# Patient Record
Sex: FEMALE | Race: WHITE | Employment: OTHER | URBAN - METROPOLITAN AREA
[De-identification: names, ages, dates, MRNs, and addresses within clinical notes are randomized per-mention and may not be internally consistent; named-entity substitution may affect disease eponyms.]

---

## 2022-02-24 ENCOUNTER — HOSPITAL ENCOUNTER (OUTPATIENT)
Dept: PHYSICAL THERAPY | Age: 67
Discharge: HOME | End: 2022-02-24
Payer: MEDICARE

## 2022-02-24 PROCEDURE — 97530 THERAPEUTIC ACTIVITIES: CPT

## 2022-02-24 PROCEDURE — 97163 PT EVAL HIGH COMPLEX 45 MIN: CPT

## 2022-02-24 NOTE — PROGRESS NOTES
Julisa Bundy  : 1955  [place]  Secondary:  [address] at [place] [id number] [facility], [address] [place] [address],[address], [address]  [phone]   [phone]       OUTPATIENT PHYSICAL THERAPY: Daily Treatment Note 2022   Visit Count:  1     ICD-10: Treatment Diagnosis: Lack of coordination (muscle incoordination) (R27.8), Pelvic floor dysfunction in female (M62.98), Mixed incontinence (Urge and stress incontinence) (N39.46), Nocturia (R35.1), Constipation, unspecified (K59.00), Pelvic and perineal pain (R10.2), Lower abdominal pain, unspecified (R10.30), Muscle spasm (M62.83) and Myalgia (M79.1)  Precautions/Allergies:   Patient has no allergy information on record. TREATMENT PLAN:  Effective Dates: 2022 TO 2022 (90 days). Frequency/Duration: 1 time a week for 90 Day(s) MEDICAL/REFERRING DIAGNOSIS:  Pelvic and perineal pain [R10.2]  Constipation [K59.00]  DATE OF ONSET: Chronic  REFERRING PHYSICIAN: Maday Zepeda, *  MD Orders: Evaluate and treat  Return MD Appointment: Unknown     Pre-treatment Symptoms/Complaints: pelvic/perineal pain, constipation, urinary incontinence (mixed)    Pain: Initial: Pain Intensity 1: 310 Post Session:  3/10   Medications Last Reviewed:  2022  Updated Objective Findings:  See evaluation note from today      TREATMENT:   THERAPEUTIC EXERCISE: (0 minutes):  Exercises per grid below to improve mobility and coordination. Required minimal visual, verbal and tactile cues to promote proper body mechanics and promote proper body breathing techniques. Progressed resistance and repetitions as indicated.    Date:   Date:   Date:     Activity/Exercise Parameters Parameters Parameters                       THERAPEUTIC ACTIVITY: (25 minutes): Functional activity education regarding anatomy, pathology and role of pelvic floor muscle (PFM) function in relation to presenting symptoms and role of pelvic floor therapy in conservative treatment., Functional activity education on avoiding bladder irritants, substitutions for common irritants, recommended fluid intake (types and spacing), appropriate voiding frequency, weight management and overall contributing factors of bowel health on bladder health/function in order to improve independent self management. Patient was provided a list of common bladder irritants including caffeine, carbonation, alcohol, artificial sweeteners, chocolate, acidic drinks, and tomato based drinks., Functional activity training to improve toilet positioning and pelvic floor muscle relaxation, to increase anorectal angle in order to improve bowel/bladder emptying and minimize straining/paradoxical PFM activation during defecation. and Instruction on coordinated pelvic floor and diaphragmatic breathing to improve kinesthetic awareness of pelvic muscle mobility and restore proper motor recruitment patterns with breathing, posture, and functional movement (e.g. appropriate lift/contraction with increased IAP such as a cough, laugh, sneeze to prevent incontinence as well as appropriate relaxation/drop to reduce pain with vaginal penetration). TA Educational Topic Notes Date Completed   Pathology/Anatomy/PFM Function Reviewed 2/24/22   Bladder health education Discussed healthy bladder habits (emphasis on water intake) 2/24/22   Urinary urge suppression     The knack     Voiding strategies     Bowel/Bladder log     Bowel health education     Constipation care     Diarrhea/Fecal leakage     Colonic massage     Toilet positioning Demonstrated and reviewed, handout provided 2/24/22   Defecation dynamics     Sources of fiber     Return to intercourse/Dilator training     Sexual positioning     Lubricants/vaginal moisturizers     Vaginal irritants     Body mechanics     Posture/ergonomics     Diaphragmatic breathing Demonstrated and reviewed 2/24/22   Resources and technology       MANUAL THERAPY: (0 minutes):  Soft tissue mobilization was utilized and necessary because of the patient's painful spasm and restricted motion of soft tissue. Date Type Location Comments   2/24/2022 Internal assessment/treatment Via vaginal canal Assessment completed                                       (Used abbreviations: MET - muscle energy technique; SCS- Strain counter strain; CTM-Connective tissue mobilizations; CR- Contract/Relax; SP- Sustained pressure; PIT- Positional inhibition techniques; STM Soft -tissue mobilization; MM- Myofascial mobilization; TrP-Trigger point release; IASTM- Instrument assisted soft tissue mobilizations, TDN-Trigger point dry needling)    Pt gives verbal consent to internal vaginal assessment/treatment without chaperon present. Treatment/Session Summary:    · Response to Treatment:  Pt reports good understanding of plan of care, as well as prescribed home exercise program.  All questions were answered to pt's satisfaction. Pt was invited to call with any further questions or concerns. · Communication/Consultation:  None today  · Equipment provided today:  None today  · Recommendations/Intent for next treatment session: Next visit will focus on MSK and functional movement screen, hip/PFM mobility as appropriate, review of toilet positioning, education on constipation management, manual techniques to hip/PFM as appropriate.   · Variation from POC: N/A  Total Treatment Billable Duration: Evaluation 45 minutes, treatment 25 minutes  PT Patient Time In/Time Out  Time In: 1378  Time Out: 2202 John Saavedra PT     Future Appointments   Date Time Provider Jose Loyd   3/3/2022 11:00 AM Dean Dave PT [facility]   3/11/2022 11:00 AM Dean Dave PT [facility]   3/17/2022 11:00 AM Dean Dave PT [facility]   3/24/2022 11:00 AM Dean Dave PT [facility]   3/31/2022 11:00 AM Dean Dave PT [facility]     Visit # Therapist initials Date Arrived NS/ Cx < 24 hr >24 hr Cx Visit Comments   1 CORINNE 2/24/22 12:21pm   Initial Assessment and Treatment                                                  Abbreviations: NS = No Show; CX = cancelled
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
## 2022-03-01 PROBLEM — E55.9 VITAMIN D DEFICIENCY: Status: ACTIVE | Noted: 2022-03-01

## 2022-03-03 ENCOUNTER — HOSPITAL ENCOUNTER (OUTPATIENT)
Dept: PHYSICAL THERAPY | Age: 67
Discharge: HOME | End: 2022-03-03
Payer: MEDICARE

## 2022-03-03 PROCEDURE — 97110 THERAPEUTIC EXERCISES: CPT

## 2022-03-03 PROCEDURE — 97530 THERAPEUTIC ACTIVITIES: CPT

## 2022-03-03 NOTE — PROGRESS NOTES
Antione Brenner  : 1955  [place]  Secondary:  [address]  at [place] [id number] [facility], [address] [place] [address],[address], [address]  [phone]   [phone]       OUTPATIENT PHYSICAL THERAPY: Daily Treatment Note 3/3/2022   Visit Count:  2     ICD-10: Treatment Diagnosis: Lack of coordination (muscle incoordination) (R27.8), Pelvic floor dysfunction in female (M62.98), Mixed incontinence (Urge and stress incontinence) (N39.46), Nocturia (R35.1), Constipation, unspecified (K59.00), Pelvic and perineal pain (R10.2), Lower abdominal pain, unspecified (R10.30), Muscle spasm (M62.83) and Myalgia (M79.1)  Precautions/Allergies:   Patient has no allergy information on record. TREATMENT PLAN:  Effective Dates: 2022 TO 2022 (90 days).   Frequency/Duration: 1 time a week for 90 Day(s) MEDICAL/REFERRING DIAGNOSIS:  Pelvic and perineal pain [R10.2]  Constipation [K59.00]  DATE OF ONSET: Chronic  REFERRING PHYSICIAN: Kurt Villafana, *  MD Orders: Evaluate and treat  Return MD Appointment: Unknown     Pre-treatment Symptoms/Complaints: pelvic/perineal pain, constipation, urinary incontinence (mixed), nocturia    Pain: in rectal area and in lower back currently, ate almonds yesterday which she feels did not help  Constipation: was doing pretty well until she ate almonds and overate in general yesterday, tried not to take laxatives so she could see how she did, did use preparation H suppository a couple of times, glycerin suppository once, [place] Stool Scale: 1 (yesterday and today)  Urinary leakage: did have one instance of leakage, small amount upon standing  Urinary frequency: 5-6x/day, 1-2x/night  Water intake: 48 oz/day  HEP: toilet positioning helped with BM slightly (was sitting up straight and engaging abdominals)    No longer feels like she has a UTI, no pain in her bladder anymore    Pain: Initial: Pain Intensity 1: 6/10 Post Session:  5/10 Medications Last Reviewed:  2/24/2022  Updated Objective Findings:  See below     3/3/22:  Palpation: via vaginal canal - NOT TODAY   Superficial Pelvic Floor Musculature (PFM): Tender? Intermediate PFM Tender? Deep PFM Tender? Superficial Transverse Perineal [] Right  [] Left  []DNT Deep Transverse Perineal [] Right  [] Left  []DNT Puborectalis [] Right  [] Left  []DNT   Ischiocavernosus [] Right  [] Left  []DNT Compressor Urethra [] Right  [] Left  []DNT Pubococcygeus [] Right  [] Left  []DNT   Bulbocavernosus [] Right  [] Left  []DNT   Iliococcygeus [] Right  [] Left  []DNT       Obturator Internus [] Right  [] Left  []DNT       Coccygeus [] Right  [] Left  []DNT     Strength:   Left Right   Hip flexion (L2/3) 5/5 5/5   Hip extension (L4-S1) 3+/5 3+/5   Hip internal rotation (L4-S1) 4/5 4+/5   Hip external rotation (L4-S1) 4/5 4/5   Hip abduction (L4-S1) 4-/5 4-/5     Range of motion:  Hip IR limited compared to hip ER bilaterally, tightness with hip IR  Otherwise WFL    External palpation:  Muscle/muscle group Tender? Adductors [x] Right  [x] Left  []DNT   Gluteals [x] Right  [x] Left  []DNT   Piriformis [x] Right  [x] Left  []DNT   Iliopsoas [] Right  [] Left  []DNT   Abdominal wall [x] Right  [x] Left  []DNT   Pubic symphysis [] Right  [] Left  []DNT         TREATMENT:   THERAPEUTIC EXERCISE: (25 minutes):  Exercises per grid below to improve mobility and coordination. Required minimal visual, verbal and tactile cues to promote proper body mechanics and promote proper body breathing techniques. Progressed resistance and repetitions as indicated.    Date:  3/3/22 Date:   Date:     Activity/Exercise Parameters Parameters Parameters   Piriformis stretch Seated, 4x30s bilaterally     Chair hamstring/inner thigh stretch 4x30s bilaterally     Happy baby stretch 4x30s     Butterfly stretch Unilateral, 4x30s bilaterally             THERAPEUTIC ACTIVITY: (30 minutes): Functional activity education on avoiding bladder irritants, substitutions for common irritants, recommended fluid intake (types and spacing), appropriate voiding frequency, weight management and overall contributing factors of bowel health on bladder health/function in order to improve independent self management. Patient was provided a list of common bladder irritants including caffeine, carbonation, alcohol, artificial sweeteners, chocolate, acidic drinks, and tomato based drinks., Functional activity training to improve toilet positioning and pelvic floor muscle relaxation, to increase anorectal angle in order to improve bowel/bladder emptying and minimize straining/paradoxical PFM activation during defecation. and Instruction on coordinated pelvic floor and diaphragmatic breathing to improve kinesthetic awareness of pelvic muscle mobility and restore proper motor recruitment patterns with breathing, posture, and functional movement (e.g. appropriate lift/contraction with increased IAP such as a cough, laugh, sneeze to prevent incontinence as well as appropriate relaxation/drop to reduce pain with vaginal penetration). TA Educational Topic Notes Date Completed   Pathology/Anatomy/PFM Function Reviewed 2/24/22   Bladder health education Discussed healthy bladder habits (emphasis on water intake) 2/24/22  3/3/22   Urinary urge suppression     The knack     Voiding strategies     Bowel/Bladder log     Bowel health education     Constipation care     Diarrhea/Fecal leakage     Colonic massage     Toilet positioning Demonstrated and reviewed, handout provided 2/24/22  3/3/22   Defecation dynamics     Sources of fiber     Return to intercourse/Dilator training     Sexual positioning     Lubricants/vaginal moisturizers     Vaginal irritants     Body mechanics     Posture/ergonomics     Diaphragmatic breathing Demonstrated and reviewed 2/24/22  3/3/22   Resources and technology       MANUAL THERAPY: (0 minutes):  Soft tissue mobilization was utilized and necessary because of the patient's painful spasm and restricted motion of soft tissue. Date Type Location Comments   3/3/2022 Internal assessment/treatment Via vaginal canal NOT TODAY                                       (Used abbreviations: MET - muscle energy technique; SCS- Strain counter strain; CTM-Connective tissue mobilizations; CR- Contract/Relax; SP- Sustained pressure; PIT- Positional inhibition techniques; STM Soft -tissue mobilization; MM- Myofascial mobilization; TrP-Trigger point release; IASTM- Instrument assisted soft tissue mobilizations, TDN-Trigger point dry needling)    Pt gives verbal consent to internal vaginal assessment/treatment without chaperon present. - NOT TODAY    Treatment/Session Summary:    · Response to Treatment:  Pt reports good understanding of plan of care, as well as prescribed home exercise program (hip/PFM mobility, toilet positioning, adequate water intake). All questions were answered to pt's satisfaction. Pt was invited to call with any further questions or concerns. · Communication/Consultation:  None today  · Equipment provided today:  None today  · Recommendations/Intent for next treatment session: Next visit will focus on hip/PFM mobility, review of toilet positioning, education on constipation management, manual techniques to hip/PFM as appropriate.   · Variation from POC: N/A  Total Treatment Billable Duration: Treatment 55 minutes  PT Patient Time In/Time Out  Time In: 1100  Time Out: 500 [facility],      Future Appointments   Date Time Provider Jose Loyd   3/11/2022 11:00 AM Mahesh Chang PT [facility]FE   3/17/2022 11:00 AM JULY Nicholas S[facility]   3/24/2022 11:00 AM Mahesh Chang PT [facility]   3/29/2022 11:00 AM [facility]   3/31/2022 11:00 AM Mahesh Chang PT [facility]   2022  2:00 PM Sallyanne Apgar, MD [facility]     Visit # Therapist initials Date Arrived NS/ Cx < 24 hr >24 hr Cx Visit Comments   1 [facility] 2/24/22 12:21pm   Initial Assessment and Treatment   2 [facility] 3/3/22 11:00am   Treatment                                         Abbreviations: NS = No Show; CX = cancelled

## 2022-03-11 ENCOUNTER — HOSPITAL ENCOUNTER (OUTPATIENT)
Dept: PHYSICAL THERAPY | Age: 67
Discharge: HOME | End: 2022-03-11
Payer: MEDICARE

## 2022-03-11 PROCEDURE — 97140 MANUAL THERAPY 1/> REGIONS: CPT

## 2022-03-11 PROCEDURE — 97110 THERAPEUTIC EXERCISES: CPT

## 2022-03-11 PROCEDURE — 97530 THERAPEUTIC ACTIVITIES: CPT

## 2022-03-11 NOTE — PROGRESS NOTES
Lesia Kramer  : 1955  [place]  Secondary:  [address]  at [address] [facility] [address] [place] [address]  [phone]   [phone]       OUTPATIENT PHYSICAL THERAPY: Daily Treatment Note 3/11/2022   Visit Count:  3     ICD-10: Treatment Diagnosis: Lack of coordination (muscle incoordination) (R27.8), Pelvic floor dysfunction in female (M62.98), Mixed incontinence (Urge and stress incontinence) (N39.46), Nocturia (R35.1), Constipation, unspecified (K59.00), Pelvic and perineal pain (R10.2), Lower abdominal pain, unspecified (R10.30), Muscle spasm (M62.83) and Myalgia (M79.1)  Precautions/Allergies:   Patient has no allergy information on record. TREATMENT PLAN:  Effective Dates: 2022 TO 2022 (90 days).   Frequency/Duration: 1 time a week for 90 Day(s) MEDICAL/REFERRING DIAGNOSIS:  Pelvic and perineal pain [R10.2]  Constipation [K59.00]  DATE OF ONSET: Chronic  REFERRING PHYSICIAN: Brent Ruiz, *  MD Orders: Evaluate and treat  Return MD Appointment: Unknown     Pre-treatment Symptoms/Complaints: pelvic/perineal pain, constipation, urinary incontinence (mixed), nocturia    Pain: happy baby was very helpful, doesn't feel pain when she walks anymore  Constipation: still has issues with this, had to take a general laxative two days ago, bulky stools, after a BM helps to relieve pressure  Urinary leakage: just very small amounts, much better control than it was, able to hold it until she gets to the bathroom most of the time  PPD: 1  Urinary frequency: 5-6x/day, 1x/night  Water intake: 32 oz/day  HEP: toilet positioning helped with BM, goes through her stretches in the morning    Is sleeping better  Holding urine better  Started back with intermittent fasting - has already lost a little bit of weight    1/10 discomfort - washed perineum with soap - feels like it's a little irritated  Being more careful with lifting objects to make sure she's not putting excess pressure on bladder/PFM    Pain: Initial: Pain Intensity 1: 1/10 Post Session:  1/10   Medications Last Reviewed:  2/24/2022  Updated Objective Findings:  See below     3/11/22:  Palpation: via vaginal canal (slight tension present superficially)  Superficial Pelvic Floor Musculature (PFM): Tender? Intermediate PFM Tender? Deep PFM Tender? Superficial Transverse Perineal [] Right  [] Left  []DNT Deep Transverse Perineal [] Right  [] Left  []DNT Puborectalis [x] Right  [] Left  []DNT   Ischiocavernosus [] Right  [] Left  []DNT Compressor Urethra [] Right  [] Left  []DNT Pubococcygeus [x] Right  [x] Left  []DNT   Bulbocavernosus [] Right  [] Left  []DNT   Iliococcygeus [x] Right  [x] Left  []DNT       Obturator Internus [x] Right  [] Left  []DNT       Coccygeus [] Right  [] Left  []DNT     3/3/22:  Strength:   Left Right   Hip flexion (L2/3) 5/5 5/5   Hip extension (L4-S1) 3+/5 3+/5   Hip internal rotation (L4-S1) 4/5 4+/5   Hip external rotation (L4-S1) 4/5 4/5   Hip abduction (L4-S1) 4-/5 4-/5     Range of motion:  Hip IR limited compared to hip ER bilaterally, tightness with hip IR  Otherwise WFL    External palpation:  Muscle/muscle group Tender? Adductors [x] Right  [x] Left  []DNT   Gluteals [x] Right  [x] Left  []DNT   Piriformis [x] Right  [x] Left  []DNT   Iliopsoas [] Right  [] Left  []DNT   Abdominal wall [x] Right  [x] Left  []DNT   Pubic symphysis [] Right  [] Left  []DNT         TREATMENT:   THERAPEUTIC EXERCISE: (10 minutes):  Exercises per grid below to improve mobility and coordination. Required minimal visual, verbal and tactile cues to promote proper body mechanics and promote proper body breathing techniques. Progressed resistance and repetitions as indicated.    Date:  3/3/22 Date:  3/11/22 Date:     Activity/Exercise Parameters Parameters Parameters   Piriformis stretch Seated, 4x30s bilaterally     Chair hamstring/inner thigh stretch 4x30s bilaterally     Happy baby stretch 4x30s 4x30s    Butterfly stretch Unilateral, 4x30s bilaterally Unilateral, 4x30s bilaterally            THERAPEUTIC ACTIVITY: (15 minutes): Functional activity education on avoiding bladder irritants, substitutions for common irritants, recommended fluid intake (types and spacing), appropriate voiding frequency, weight management and overall contributing factors of bowel health on bladder health/function in order to improve independent self management. Patient was provided a list of common bladder irritants including caffeine, carbonation, alcohol, artificial sweeteners, chocolate, acidic drinks, and tomato based drinks., Functional activity training to improve toilet positioning and pelvic floor muscle relaxation, to increase anorectal angle in order to improve bowel/bladder emptying and minimize straining/paradoxical PFM activation during defecation. and Instruction on coordinated pelvic floor and diaphragmatic breathing to improve kinesthetic awareness of pelvic muscle mobility and restore proper motor recruitment patterns with breathing, posture, and functional movement (e.g. appropriate lift/contraction with increased IAP such as a cough, laugh, sneeze to prevent incontinence as well as appropriate relaxation/drop to reduce pain with vaginal penetration).   TA Educational Topic Notes Date Completed   Pathology/Anatomy/PFM Function Reviewed 2/24/22   Bladder health education Discussed healthy bladder habits (emphasis on water intake) 2/24/22  3/3/22  3/11/22   Urinary urge suppression     The knack     Voiding strategies     Bowel/Bladder log     Bowel health education     Constipation care     Diarrhea/Fecal leakage     Colonic massage     Toilet positioning Demonstrated and reviewed, handout provided 2/24/22  3/3/22  3/11/22   Defecation dynamics     Sources of fiber     Return to intercourse/Dilator training     Sexual positioning     Lubricants/vaginal moisturizers Vaginal irritants     Body mechanics     Posture/ergonomics     Diaphragmatic breathing Demonstrated and reviewed 2/24/22  3/3/22  3/11/22   Resources and technology       MANUAL THERAPY: (45 minutes): Soft tissue mobilization was utilized and necessary because of the patient's painful spasm and restricted motion of soft tissue. Date Type Location Comments   3/11/2022 Internal assessment/treatment Via vaginal canal SP, STM, and CR at [facility]     Hip adductors STM, TrP, and skin rolling to bilateral hip adductors                                 (Used abbreviations: MET - muscle energy technique; SCS- Strain counter strain; CTM-Connective tissue mobilizations; CR- Contract/Relax; SP- Sustained pressure; PIT- Positional inhibition techniques; STM Soft -tissue mobilization; MM- Myofascial mobilization; TrP-Trigger point release; IASTM- Instrument assisted soft tissue mobilizations, TDN-Trigger point dry needling)    Pt gives verbal consent to internal vaginal assessment/treatment without chaperon present. Treatment/Session Summary:    · Response to Treatment:  Pt tolerated manual techniques well. Fewer tender points than at initial evaluation. Tender spots that were present decreased in intensity with manual techniques and diaphragmatic breathing. Pt did well with hip/PFM mobility exercises this date. Pt reports good understanding of plan of care, as well as prescribed home exercise program (hip/PFM mobility, toilet positioning, adequate water intake). All questions were answered to pt's satisfaction. Pt was invited to call with any further questions or concerns. · Communication/Consultation:  None today  · Equipment provided today:  None today  · Recommendations/Intent for next treatment session: Next visit will focus on hip/PFM mobility, review of toilet positioning, education on constipation management, manual techniques to hip/PFM as appropriate.   · Variation from POC: N/A  Total Treatment Billable Duration: Treatment 70 minutes  PT Patient Time In/Time Out  Time In: 1050  Time Out: [address], PT     Future Appointments   Date Time Provider Jose Loyd   3/17/2022 11:00 AM Coco Waters, PT [facility]   3/24/2022 11:00 AM Coco Waters, PT [facility]   3/29/2022 11:00 AM [facility]   3/31/2022 11:00 AM Coco Waters, PT [facility] E   4/5/2022  2:00 PM Lucy Monge MD [facility]     Visit # Therapist initials Date Arrived NS/ Cx < 24 hr >24 hr Cx Visit Comments   1 [facility] 2/24/22 12:21pm   Initial Assessment and Treatment   2 [facility] 3/3/22 11:00am   Treatment   3 [facility] 3/11/22 10:47am   Treatment                                Abbreviations: NS = No Show; CX = cancelled

## 2022-03-17 ENCOUNTER — HOSPITAL ENCOUNTER (OUTPATIENT)
Dept: PHYSICAL THERAPY | Age: 67
Discharge: HOME | End: 2022-03-17
Payer: MEDICARE

## 2022-03-17 PROCEDURE — 97110 THERAPEUTIC EXERCISES: CPT

## 2022-03-17 PROCEDURE — 97530 THERAPEUTIC ACTIVITIES: CPT

## 2022-03-17 NOTE — PROGRESS NOTES
Chuck Nava  : 1955  [place]  Secondary:  [address]  at [place] [id number] [facility], [address] [place] [address],[address], [address]  [phone]   [phone]       OUTPATIENT PHYSICAL THERAPY: Daily Treatment Note 3/17/2022   Visit Count:  4     ICD-10: Treatment Diagnosis: Lack of coordination (muscle incoordination) (R27.8), Pelvic floor dysfunction in female (M62.98), Mixed incontinence (Urge and stress incontinence) (N39.46), Nocturia (R35.1), Constipation, unspecified (K59.00), Pelvic and perineal pain (R10.2), Lower abdominal pain, unspecified (R10.30), Muscle spasm (M62.83) and Myalgia (M79.1)  Precautions/Allergies:   Patient has no allergy information on record. TREATMENT PLAN:  Effective Dates: 2022 TO 2022 (90 days).   Frequency/Duration: 1 time a week for 90 Day(s) MEDICAL/REFERRING DIAGNOSIS:  Pelvic and perineal pain [R10.2]  Constipation [K59.00]  DATE OF ONSET: Chronic  REFERRING PHYSICIAN: Olegario Rios, *  MD Orders: Evaluate and treat  Return MD Appointment: Unknown     Pre-treatment Symptoms/Complaints: pelvic/perineal pain, constipation, urinary incontinence (mixed), nocturia    Had some muscle spasms this week, thinks she tried to stretch too far one time and it caused a spasm - sitting still in [employer] she had a teddy horse in her leg  Yesterday did some stretches without pushing too far and did pretty well    Pain: feels like she stretched too far one time, when she stretches with less intensity it helps with tension/tightness and pain  Constipation: 2 BM yesterday, did not have to push/strain then, other days of the week had to take something to help with constipation and alleviate bowel pressure  Urinary leakage: very little leakage this week, much better controlled than it was, able to hold it until she gets to the bathroom  PPD: 1  Urinary frequency: 5-6x/day, 1x/night  Water intake: 32 oz/day  HEP: toilet positioning helped with BM, goes through her stretches in the morning    Is sleeping better (unless she's in pain/has a lot of stiffness)  Holding urine better  Started back with intermittent fasting - has lost 6 pounds    Being more careful with lifting objects to make sure she's not putting excess pressure on bladder/PFM    Hasn't stretched this morning yet    Pain: Initial: Pain Intensity 1: 0/10 Post Session:  0/10   Medications Last Reviewed:  2/24/2022  Updated Objective Findings:  See below     3/17/22:  None today    3/11/22:  Palpation: via vaginal canal (slight tension present superficially)  Superficial Pelvic Floor Musculature (PFM): Tender? Intermediate PFM Tender? Deep PFM Tender? Superficial Transverse Perineal [] Right  [] Left  []DNT Deep Transverse Perineal [] Right  [] Left  []DNT Puborectalis [x] Right  [] Left  []DNT   Ischiocavernosus [] Right  [] Left  []DNT Compressor Urethra [] Right  [] Left  []DNT Pubococcygeus [x] Right  [x] Left  []DNT   Bulbocavernosus [] Right  [] Left  []DNT   Iliococcygeus [x] Right  [x] Left  []DNT       Obturator Internus [x] Right  [] Left  []DNT       Coccygeus [] Right  [] Left  []DNT     3/3/22:  Strength:   Left Right   Hip flexion (L2/3) 5/5 5/5   Hip extension (L4-S1) 3+/5 3+/5   Hip internal rotation (L4-S1) 4/5 4+/5   Hip external rotation (L4-S1) 4/5 4/5   Hip abduction (L4-S1) 4-/5 4-/5     Range of motion:  Hip IR limited compared to hip ER bilaterally, tightness with hip IR  Otherwise WFL    External palpation:  Muscle/muscle group Tender? Adductors [x] Right  [x] Left  []DNT   Gluteals [x] Right  [x] Left  []DNT   Piriformis [x] Right  [x] Left  []DNT   Iliopsoas [] Right  [] Left  []DNT   Abdominal wall [x] Right  [x] Left  []DNT   Pubic symphysis [] Right  [] Left  []DNT         TREATMENT:   THERAPEUTIC EXERCISE: (25 minutes):  Exercises per grid below to improve mobility and coordination.   Required minimal visual, verbal and tactile cues to promote proper body mechanics and promote proper body breathing techniques. Progressed resistance and repetitions as indicated. Date:  3/3/22 Date:  3/11/22 Date:  3/17/22   Activity/Exercise Parameters Parameters Parameters   Piriformis stretch Seated, 4x30s bilaterally  Seated, 4x30s bilaterally   Chair hamstring/inner thigh stretch 4x30s bilaterally  4x30s bilaterally   Happy baby stretch 4x30s 4x30s 2x30   Butterfly stretch Unilateral, 4x30s bilaterally Unilateral, 4x30s bilaterally Unilateral, 2x30s bilaterally    Supported butterfly, pt propped up, legs supported, with diaphragmatic breathing  5 min           THERAPEUTIC ACTIVITY: (30 minutes): Functional activity education on avoiding bladder irritants, substitutions for common irritants, recommended fluid intake (types and spacing), appropriate voiding frequency, weight management and overall contributing factors of bowel health on bladder health/function in order to improve independent self management. Patient was provided a list of common bladder irritants including caffeine, carbonation, alcohol, artificial sweeteners, chocolate, acidic drinks, and tomato based drinks., Functional activity training to improve toilet positioning and pelvic floor muscle relaxation, to increase anorectal angle in order to improve bowel/bladder emptying and minimize straining/paradoxical PFM activation during defecation. , Patient education regarding vaginal/vulvar irritants possibly contributing to pain including washing the perineum, menstrual flow, sexual contact, medications, clothing and other suggestions for comfort.   and Instruction on coordinated pelvic floor and diaphragmatic breathing to improve kinesthetic awareness of pelvic muscle mobility and restore proper motor recruitment patterns with breathing, posture, and functional movement (e.g. appropriate lift/contraction with increased IAP such as a cough, laugh, sneeze to prevent incontinence as well as appropriate relaxation/drop to reduce pain with vaginal penetration). TA Educational Topic Notes Date Completed   Pathology/Anatomy/PFM Function Reviewed 2/24/22   Bladder health education Discussed healthy bladder habits (emphasis on water intake) 2/24/22  3/3/22  3/11/22  3/17/22   Urinary urge suppression     The knack     Voiding strategies     Bowel/Bladder log     Bowel health education     Constipation care     Diarrhea/Fecal leakage     Colonic massage     Toilet positioning Demonstrated and reviewed, handout provided 2/24/22  3/3/22  3/11/22   Defecation dynamics     Sources of fiber     Return to intercourse/Dilator training     Sexual positioning     Lubricants/vaginal moisturizers     Vaginal irritants Discussed vaginal/vulvar irritants and good vaginal/vulvar hygiene 3/17/22   Body mechanics     Posture/ergonomics     Diaphragmatic breathing Demonstrated and reviewed 2/24/22  3/3/22  3/11/22  3/17/22   Resources and technology       MANUAL THERAPY: (0 minutes): Soft tissue mobilization was utilized and necessary because of the patient's painful spasm and restricted motion of soft tissue.    Date Type Location Comments   3/17/2022 Internal assessment/treatment Via vaginal canal SP, STM, and CR at [facility] - NOT TODAY     Hip adductors STM, TrP, and skin rolling to bilateral hip adductors - NOT TODAY                                 (Used abbreviations: MET - muscle energy technique; SCS- Strain counter strain; CTM-Connective tissue mobilizations; CR- Contract/Relax; SP- Sustained pressure; PIT- Positional inhibition techniques; STM Soft -tissue mobilization; MM- Myofascial mobilization; TrP-Trigger point release; IASTM- Instrument assisted soft tissue mobilizations, TDN-Trigger point dry needling)    Pt gives verbal consent to internal vaginal assessment/treatment without chaperon present. - NOT TODAY    Treatment/Session Summary:    · Response to Treatment:  Pt did well with relaxation techniques and hip/PFM Mobility exercises. Pt reports good understanding of plan of care, as well as prescribed home exercise program (hip/PFM mobility, toilet positioning, adequate water intake). All questions were answered to pt's satisfaction. Pt was invited to call with any further questions or concerns. · Communication/Consultation:  None today  · Equipment provided today:  None today  · Recommendations/Intent for next treatment session: Next visit will focus on hip/PFM mobility, review of toilet positioning, education on constipation management, manual techniques to hip/PFM as appropriate, relaxation techniques.    · Variation from POC: N/A  Total Treatment Billable Duration: Treatment 55 minutes  PT Patient Time In/Time Out  Time In: 1100  Time Out: 500 [facility], PT     Future Appointments   Date Time Provider Jose Loyd   [facility]2022 [facility]   3/29/2022 11:00 AM [facility]   3/31/2022 11:00 AM JULY Prado [facility]   4/5/2022  2:00 PM Raul Mann MD [facility]     Visit # Therapist initials Date Arrived NS/ Cx < 24 hr >24 hr Cx Visit Comments   1 CORINNE 2/24/22 12:21pm   Initial Assessment and Treatment   2 CORINNE 3/3/22 11:00am   Treatment   3 CORINNE 3/11/22 10:47am   Treatment   4 CORINNE 3/17/22 11:00am   Treatment                       Abbreviations: NS = No Show; CX = cancelled

## 2022-03-24 ENCOUNTER — HOSPITAL ENCOUNTER (OUTPATIENT)
Dept: PHYSICAL THERAPY | Age: 67
Discharge: HOME | End: 2022-03-24
Payer: MEDICARE

## 2022-03-24 PROCEDURE — 97530 THERAPEUTIC ACTIVITIES: CPT

## 2022-03-24 NOTE — PROGRESS NOTES
Luis Matt  : 1955  [place]  Secondary:  [address] at [place] [id number] [facility], [address] [place] [address],[address], [address]  [phone]   [phone]       OUTPATIENT PHYSICAL THERAPY: Daily Treatment Note 3/24/2022   Visit Count:  5     ICD-10: Treatment Diagnosis: Lack of coordination (muscle incoordination) (R27.8), Pelvic floor dysfunction in female (M62.98), Mixed incontinence (Urge and stress incontinence) (N39.46), Nocturia (R35.1), Constipation, unspecified (K59.00), Pelvic and perineal pain (R10.2), Lower abdominal pain, unspecified (R10.30), Muscle spasm (M62.83) and Myalgia (M79.1)  Precautions/Allergies:   Patient has no allergy information on record. TREATMENT PLAN:  Effective Dates: 2022 TO 2022 (90 days).   Frequency/Duration: 1 time a week for 90 Day(s) MEDICAL/REFERRING DIAGNOSIS:  Pelvic and perineal pain [R10.2]  Constipation [K59.00]  DATE OF ONSET: Chronic  REFERRING PHYSICIAN: Larry Browning, *  MD Orders: Evaluate and treat  Return MD Appointment: Unknown     Pre-treatment Symptoms/Complaints: pelvic/perineal pain, constipation, urinary incontinence (mixed), nocturia    Pain: overall better, relaxation exercises help with muscle spasms, more activity = more discomfort, but she feels more comfortable trying more activities because she has been feeling better  Constipation: has been intermittent fasting, fasted for almost 48 hours -Tuesday, so hasn't had a BM every day, after last session did have a BM on her own one time without a laxative, since then has taken a laxative to alleviate bowel pressure, does not have to push/strain  Urinary leakage: no leakage since last visit, urgency is better controlled, able to hold it until she gets to the bathroom  PPD: 1 (just in case)  Urinary frequency: 5-6x/day, 0-1x/night  Fluid intake: 32 oz/day of plain water; 2 diluted K-cups of coffee every morning; going to cut back on tea use  HEP: toilet positioning helped with BM, goes through her stretches in the morning and those are going well    Is sleeping better (unless she's in pain/has a lot of stiffness)  Holding urine better  Started back with intermittent fasting - has lost 8 pounds    Being more careful with lifting objects to make sure she's not putting excess pressure on bladder/PFM    Hasn't stretched this morning yet    Pain: Initial: Pain Intensity 1: 0/10 Post Session:  0/10   Medications Last Reviewed:  2/24/2022  Updated Objective Findings:  See below     3/24/22:  None today    3/11/22:  Palpation: via vaginal canal (slight tension present superficially)  Superficial Pelvic Floor Musculature (PFM): Tender? Intermediate PFM Tender? Deep PFM Tender? Superficial Transverse Perineal [] Right  [] Left  []DNT Deep Transverse Perineal [] Right  [] Left  []DNT Puborectalis [x] Right  [] Left  []DNT   Ischiocavernosus [] Right  [] Left  []DNT Compressor Urethra [] Right  [] Left  []DNT Pubococcygeus [x] Right  [x] Left  []DNT   Bulbocavernosus [] Right  [] Left  []DNT   Iliococcygeus [x] Right  [x] Left  []DNT       Obturator Internus [x] Right  [] Left  []DNT       Coccygeus [] Right  [] Left  []DNT     3/3/22:  Strength:   Left Right   Hip flexion (L2/3) 5/5 5/5   Hip extension (L4-S1) 3+/5 3+/5   Hip internal rotation (L4-S1) 4/5 4+/5   Hip external rotation (L4-S1) 4/5 4/5   Hip abduction (L4-S1) 4-/5 4-/5     Range of motion:  Hip IR limited compared to hip ER bilaterally, tightness with hip IR  Otherwise WFL    External palpation:  Muscle/muscle group Tender? Adductors [x] Right  [x] Left  []DNT   Gluteals [x] Right  [x] Left  []DNT   Piriformis [x] Right  [x] Left  []DNT   Iliopsoas [] Right  [] Left  []DNT   Abdominal wall [x] Right  [x] Left  []DNT   Pubic symphysis [] Right  [] Left  []DNT         TREATMENT:   THERAPEUTIC EXERCISE: (0 minutes):  Exercises per grid below to improve mobility and coordination. Required minimal visual, verbal and tactile cues to promote proper body mechanics and promote proper body breathing techniques. Progressed resistance and repetitions as indicated. Date:  3/3/22 Date:  3/11/22 Date:  3/17/22   Activity/Exercise Parameters Parameters Parameters   Piriformis stretch Seated, 4x30s bilaterally  Seated, 4x30s bilaterally   Chair hamstring/inner thigh stretch 4x30s bilaterally  4x30s bilaterally   Happy baby stretch 4x30s 4x30s 2x30   Butterfly stretch Unilateral, 4x30s bilaterally Unilateral, 4x30s bilaterally Unilateral, 2x30s bilaterally    Supported butterfly, pt propped up, legs supported, with diaphragmatic breathing  5 min           THERAPEUTIC ACTIVITY: (55 minutes): Functional activity education on avoiding bladder irritants, substitutions for common irritants, recommended fluid intake (types and spacing), appropriate voiding frequency, weight management and overall contributing factors of bowel health on bladder health/function in order to improve independent self management. Patient was provided a list of common bladder irritants including caffeine, carbonation, alcohol, artificial sweeteners, chocolate, acidic drinks, and tomato based drinks., Functional activity training to improve toilet positioning and pelvic floor muscle relaxation, to increase anorectal angle in order to improve bowel/bladder emptying and minimize straining/paradoxical PFM activation during defecation. , Patient education regarding vaginal/vulvar irritants possibly contributing to pain including washing the perineum, menstrual flow, sexual contact, medications, clothing and other suggestions for comfort. , Instruction on functional pelvic brace exercise including feedforward activation of pelvic floor muscles on exhale prior to activity that increases intra-abdominal pressure (IAP) such as cough, sneeze, laugh, sit to stand, lifting object to decrease pressure on pelvic organs and muscles during exertional activities to minimize pelvic pain/pressure., Extensive functional activity training on avoiding valsava/breath holding during strenuous activities as well as abdominal/pelvic bracing to provide muscular support and decrease symptoms of pelvic pain/pressure during lifting, sit to stand, getting in/out of car as well as log roll technique to decrease intraabdominal pressure. and Instruction on coordinated pelvic floor and diaphragmatic breathing to improve kinesthetic awareness of pelvic muscle mobility and restore proper motor recruitment patterns with breathing, posture, and functional movement (e.g. appropriate lift/contraction with increased IAP such as a cough, laugh, sneeze to prevent incontinence as well as appropriate relaxation/drop to reduce pain with vaginal penetration). Pt education on exercise activity options and ice vs. heat for managing symptoms.   TA Educational Topic Notes Date Completed   Pathology/Anatomy/PFM Function Reviewed 2/24/22   Bladder health education Discussed healthy bladder habits (emphasis on water intake)      Reviewed water intake importance and effect of nutrition on bladder health (bladder irritants) 2/24/22  3/3/22  3/11/22  3/17/22  3/24/22   Urinary urge suppression     The knack     Voiding strategies     Bowel/Bladder log     Bowel health education Discussed effect of nutrition on bowel health 3/24/22   Constipation care     Diarrhea/Fecal leakage     Colonic massage     Toilet positioning Demonstrated and reviewed, handout provided 2/24/22  3/3/22  3/11/22  3/24/22   Defecation dynamics     Sources of fiber     Return to intercourse/Dilator training     Sexual positioning     Lubricants/vaginal moisturizers     Vaginal irritants Discussed vaginal/vulvar irritants and good vaginal/vulvar hygiene 3/17/22  3/24/22   Body mechanics Reviewed body mechanics during cleaning activities, lifting, bending, and squatting 3/24/22   Posture/ergonomics     Diaphragmatic breathing Demonstrated and reviewed 2/24/22  3/3/22  3/11/22  3/17/22  3/24/22   Resources and technology     Patient education Exercise options - pool walking, walking  Ice for acute pain, mild heat for relaxation 3/24/22     MANUAL THERAPY: (0 minutes): Soft tissue mobilization was utilized and necessary because of the patient's painful spasm and restricted motion of soft tissue. Date Type Location Comments   3/24/2022 Internal assessment/treatment Via vaginal canal SP, STM, and CR at [facility] - NOT TODAY     Hip adductors STM, TrP, and skin rolling to bilateral hip adductors - NOT TODAY                                 (Used abbreviations: MET - muscle energy technique; SCS- Strain counter strain; CTM-Connective tissue mobilizations; CR- Contract/Relax; SP- Sustained pressure; PIT- Positional inhibition techniques; STM Soft -tissue mobilization; MM- Myofascial mobilization; TrP-Trigger point release; IASTM- Instrument assisted soft tissue mobilizations, TDN-Trigger point dry needling)    Pt gives verbal consent to internal vaginal assessment/treatment without chaperon present. - NOT TODAY    Treatment/Session Summary:    · Response to Treatment:  Pt verbalized good understanding of effect of nutrition and fluid intake on bowel/bladder health, appropriate modality use to manage symptoms (I.e. ice/heat), benefit of exercise/conditioning, and body mechanics during daily activities. Pt reports good understanding of plan of care, as well as prescribed home exercise program (hip/PFM mobility, toilet positioning, adequate water intake). All questions were answered to pt's satisfaction. Pt was invited to call with any further questions or concerns.   · Communication/Consultation:  None today  · Equipment provided today:  None today  · Recommendations/Intent for next treatment session: Next visit will focus on reassessment of PFM as appropriate, hip/PFM mobility, review of toilet positioning, education on constipation management, manual techniques to hip/PFM as appropriate, relaxation techniques.    · Variation from POC: N/A  Total Treatment Billable Duration: Treatment 55 minutes  PT Patient Time In/Time Out  Time In: 1105  Time Out: 41 Alex Funes PT     Future Appointments   Date Time Provider Jose Loyd   3/31/2022 11:00 AM Angelica Perdue PT [facility]     Visit # Therapist initials Date Arrived NS/ Cx < 24 hr >24 hr Cx Visit Comments   1 CORINNE 2/24/22 12:21pm   Initial Assessment and Treatment   2 CORINNE 3/3/22 11:00am   Treatment   3 CORINNE 3/11/22 10:47am   Treatment   4 CORINNE 3/17/22 11:00am   Treatment   5 CORINNE 3/24/22 10:49am   Treatment              Abbreviations: NS = No Show; CX = cancelled

## 2022-03-31 ENCOUNTER — HOSPITAL ENCOUNTER (OUTPATIENT)
Dept: PHYSICAL THERAPY | Age: 67
Discharge: HOME | End: 2022-03-31
Payer: MEDICARE

## 2022-03-31 NOTE — PROGRESS NOTES
Chet Floyd  : 1955  [place]  Secondary:  [address] at [place] [id number] [facility], [address] [place] [address],[address], [facility] [address].  [phone]   [phone]       OUTPATIENT DAILY NOTE    NAME/AGE/GENDER: Chet Floyd is a 73 y.o. female. DATE: 3/31/2022    Patient cancelled (more than 24 hours ago) her appointment for today due to illness. Will plan to follow up on next scheduled visit.     Berta Blanco, PT    Future Appointments   Date Time Provider Jose Loyd   3/31/2022  7:00 PM Esther Zuniga [facility]RIG[facility]   2022 11:00 AM Esther Zuniga, JULY C[facility]

## 2022-04-06 NOTE — PROGRESS NOTES
Juan Archibald  : 1955  [place]  Secondary:  [address] at [place] [id number] [facility], [address] [place] [address],[address], [address]  [phone]   [phone]       OUTPATIENT PHYSICAL THERAPY: Daily Treatment Note 2022   Visit Count:  6     ICD-10: Treatment Diagnosis: Lack of coordination (muscle incoordination) (R27.8), Pelvic floor dysfunction in female (M62.98), Mixed incontinence (Urge and stress incontinence) (N39.46), Nocturia (R35.1), Constipation, unspecified (K59.00), Pelvic and perineal pain (R10.2), Lower abdominal pain, unspecified (R10.30), Muscle spasm (M62.83) and Myalgia (M79.1)  Precautions/Allergies:   Patient has no allergy information on record. TREATMENT PLAN:  Effective Dates: 2022 TO 2022 (90 days).   Frequency/Duration: 1 time a week for 90 Day(s) MEDICAL/REFERRING DIAGNOSIS:  Pelvic and perineal pain [R10.2]  Constipation [K59.00]  DATE OF ONSET: Chronic  REFERRING PHYSICIAN: Diane Quiles, *  MD Orders: Evaluate and treat  Return MD Appointment: Unknown     Pre-treatment Symptoms/Complaints: pelvic/perineal pain, constipation, urinary incontinence (mixed), nocturia    Pain: overall better, relaxation exercises help with muscle spasms, more activity = more discomfort, but she feels more comfortable trying more activities because she has been feeling better  Constipation: has had BMs on her own this week without needing a laxative or suppository  Urinary leakage: no leakage since last visit, urgency is better controlled, able to hold it until she gets to the bathroom  PPD: 1 (just in case)  Urinary frequency: 5-6x/day, 0-1x/night  Fluid intake: 32 oz/day of plain water; 2 diluted K-cups of coffee every morning; going to cut back on tea use  HEP: toilet positioning helped with BM, goes through her stretches in the morning and those are going well    Pain: Initial: Pain Intensity 1: 0/10 Post Session:  0/10 Medications Last Reviewed:  2/24/2022  Updated Objective Findings:  See below     4/7/22:  See discharge note from today    3/11/22:  Palpation: via vaginal canal (slight tension present superficially)  Superficial Pelvic Floor Musculature (PFM): Tender? Intermediate PFM Tender? Deep PFM Tender? Superficial Transverse Perineal [] Right  [] Left  []DNT Deep Transverse Perineal [] Right  [] Left  []DNT Puborectalis [x] Right  [] Left  []DNT   Ischiocavernosus [] Right  [] Left  []DNT Compressor Urethra [] Right  [] Left  []DNT Pubococcygeus [x] Right  [x] Left  []DNT   Bulbocavernosus [] Right  [] Left  []DNT   Iliococcygeus [x] Right  [x] Left  []DNT       Obturator Internus [x] Right  [] Left  []DNT       Coccygeus [] Right  [] Left  []DNT     3/3/22:  Strength:   Left Right   Hip flexion (L2/3) 5/5 5/5   Hip extension (L4-S1) 3+/5 3+/5   Hip internal rotation (L4-S1) 4/5 4+/5   Hip external rotation (L4-S1) 4/5 4/5   Hip abduction (L4-S1) 4-/5 4-/5     Range of motion:  Hip IR limited compared to hip ER bilaterally, tightness with hip IR  Otherwise WFL    External palpation:  Muscle/muscle group Tender? Adductors [x] Right  [x] Left  []DNT   Gluteals [x] Right  [x] Left  []DNT   Piriformis [x] Right  [x] Left  []DNT   Iliopsoas [] Right  [] Left  []DNT   Abdominal wall [x] Right  [x] Left  []DNT   Pubic symphysis [] Right  [] Left  []DNT         TREATMENT:   THERAPEUTIC EXERCISE: (5 minutes):  Exercises per grid below to improve mobility and coordination. Required minimal visual, verbal and tactile cues to promote proper body mechanics and promote proper body breathing techniques. Progressed resistance and repetitions as indicated.    Date:  3/3/22 Date:  3/11/22 Date:  3/17/22 Date:  4/7/22   Activity/Exercise Parameters Parameters Parameters Parameters   Piriformis stretch Seated, 4x30s bilaterally  Seated, 4x30s bilaterally    Chair hamstring/inner thigh stretch 4x30s bilaterally  4x30s bilaterally Happy baby stretch 4x30s 4x30s 2x30    Butterfly stretch Unilateral, 4x30s bilaterally Unilateral, 4x30s bilaterally Unilateral, 2x30s bilaterally    Supported butterfly, pt propped up, legs supported, with diaphragmatic breathing  5 min    PFM activation    X10, 2 sec holds   HEP    Updated and reviewed     THERAPEUTIC ACTIVITY: (40 minutes): Functional activity education on avoiding bladder irritants, substitutions for common irritants, recommended fluid intake (types and spacing), appropriate voiding frequency, weight management and overall contributing factors of bowel health on bladder health/function in order to improve independent self management. Patient was provided a list of common bladder irritants including caffeine, carbonation, alcohol, artificial sweeteners, chocolate, acidic drinks, and tomato based drinks., Functional activity training to improve toilet positioning and pelvic floor muscle relaxation, to increase anorectal angle in order to improve bowel/bladder emptying and minimize straining/paradoxical PFM activation during defecation. , Extensive functional activity training on defecation dynamics with emphasis on positioning/posture and use of coordinated exhalation/expanded abdomen, breathing, coordination of PFM bulge and external anal sphincter relaxation in order to improve bowel/bladder emptying reduce need for straining and/or digital evacuation., Instruction on functional pelvic brace exercise including feedforward activation of pelvic floor muscles on exhale prior to activity that increases intra-abdominal pressure (IAP) such as cough, sneeze, laugh, sit to stand, lifting object to decrease pressure on pelvic organs and muscles during exertional activities to minimize pelvic pain/pressure., Extensive functional activity training on avoiding valsava/breath holding during strenuous activities as well as abdominal/pelvic bracing to provide muscular support and decrease symptoms of pelvic pain/pressure during lifting, sit to stand, getting in/out of car as well as log roll technique to decrease intraabdominal pressure. and Instruction on coordinated pelvic floor and diaphragmatic breathing to improve kinesthetic awareness of pelvic muscle mobility and restore proper motor recruitment patterns with breathing, posture, and functional movement (e.g. appropriate lift/contraction with increased IAP such as a cough, laugh, sneeze to prevent incontinence as well as appropriate relaxation/drop to reduce pain with vaginal penetration). TA Educational Topic Notes Date Completed   Pathology/Anatomy/PFM Function Reviewed 2/24/22   Bladder health education Discussed healthy bladder habits (emphasis on water intake)      Reviewed water intake importance and effect of nutrition on bladder health (bladder irritants) 2/24/22  3/3/22  3/11/22  3/17/22  3/24/22  4/7/22   Urinary urge suppression     The knack     Voiding strategies     Bowel/Bladder log     Bowel health education Discussed effect of nutrition on bowel health 3/24/22  4/7/22   Constipation care     Diarrhea/Fecal leakage     Colonic massage     Toilet positioning Demonstrated and reviewed, handout provided 2/24/22  3/3/22  3/11/22  3/24/22  4/7/22   Defecation dynamics Practiced with tactile feedback 4/7/22   Sources of fiber     Return to intercourse/Dilator training     Sexual positioning     Lubricants/vaginal moisturizers     Vaginal irritants Discussed vaginal/vulvar irritants and good vaginal/vulvar hygiene 3/17/22  3/24/22   Body mechanics Reviewed body mechanics during cleaning activities, lifting, bending, and squatting 3/24/22  4/7/22   Posture/ergonomics     Diaphragmatic breathing Demonstrated and reviewed 2/24/22  3/3/22  3/11/22  3/17/22  3/24/22  4/7/22   Resources and technology     Patient education Exercise options - pool walking, walking  Ice for acute pain, mild heat for relaxation 3/24/22     MANUAL THERAPY: (10 minutes):  Soft tissue mobilization was utilized and necessary because of the patient's painful spasm and restricted motion of soft tissue. Date Type Location Comments   4/7/2022 Internal assessment/treatment Via vaginal canal Reassessment completed  SP, STM, and CR to PFM      Hip adductors STM, TrP, and skin rolling to bilateral hip adductors - NOT TODAY                                 (Used abbreviations: MET - muscle energy technique; SCS- Strain counter strain; CTM-Connective tissue mobilizations; CR- Contract/Relax; SP- Sustained pressure; PIT- Positional inhibition techniques; STM Soft -tissue mobilization; MM- Myofascial mobilization; TrP-Trigger point release; IASTM- Instrument assisted soft tissue mobilizations, TDN-Trigger point dry needling)    Pt gives verbal consent to internal vaginal assessment/treatment without chaperon present. Treatment/Session Summary:    · Response to Treatment:  Pt tolerated internal assessment/treatment well with minimal discomfort. Any pain present decreased with manual techniques and diaphragmatic breathing. No pain after examination either. Pt reports good understanding of plan of care, as well as prescribed home exercise program (hip/PFM mobility, toilet positioning, adequate water intake). Pt is independent with HEP and is able to manage symptoms well independently. Will be discharged this date. All questions were answered to pt's satisfaction. Pt was invited to call with any further questions or concerns. · Communication/Consultation:  None today  · Equipment provided today:  None today  Total Treatment Billable Duration: Treatment 55 minutes  PT Patient Time In/Time Out  Time In: 1055  Time Out: [address], PT     No future appointments.   Visit # Therapist initials Date Arrived NS/ Cx < 24 hr >24 hr Cx Visit Comments   1 CORINNE 2/24/22 12:21pm   Initial Assessment and Treatment   2 [facility] 3/3/22 11:00am   Treatment   3 CORINNE 3/11/22 10:47am   Treatment   4 CORINNE 3/17/22 11:00am Treatment   5 CORINNE 3/24/22 10:49am   Treatment    CORINNE 3/31/22   Cx >24 hr    6 CORINNE 4/7/22 10:46am   Treatment and discharge     Abbreviations: NS = No Show; CX = cancelled

## 2022-04-06 NOTE — THERAPY DISCHARGE
Chay [place]  : 1955  [place]  Secondary:  [address]  at [place] [address] [facility], [address] [place] [address],[address], [address]  [phone]   [phone]         [address] 54 Assessment 2022   ICD-10: Treatment Diagnosis: Lack of coordination (muscle incoordination) (R27.8), Pelvic floor dysfunction in female (M62.98), Mixed incontinence (Urge and stress incontinence) (N39.46), Nocturia (R35.1), Constipation, unspecified (K59.00), Pelvic and perineal pain (R10.2), Lower abdominal pain, unspecified (R10.30), Muscle spasm (M62.83) and Myalgia (M79.1)  Precautions/Allergies:   Adhesive tape-silicones, Aspirin, Avocado oil, Ciprofloxacin, Hymenoptera allergenic extract, Ibuprofen, and Tetanus and diphtheria toxoids  TREATMENT PLAN:  Effective Dates: 2022 TO 2022 (90 days). Frequency/Duration: 1 time a week for 90 Day(s) MEDICAL/REFERRING DIAGNOSIS:  Pelvic and perineal pain [R10.2]  Constipation [K59.00]  DATE OF ONSET: Chronic  REFERRING PHYSICIAN: Ileana Hyde, *  MD Orders: Evaluate and treat  Return MD Appointment: Unknown     INITIAL ASSESSMENT: Chay Alvareztingham presents with musculoskeletal limitations including pelvic floor muscle (PFM) tension, reduced PFM Range of Motion (ROM), increased tenderness to palpation of the PFM, poor posture, reduced coordination and awareness of PFM and decreased pelvic floor muscle (PFM) strength. These limitations are impairing the patient's ability to properly coordinate perineal elevation and descent for normalized PFM function, contributing to pain and urinary dysfunction and bowel dysfunction. As a result, she is limited in her ability to participate in daily tasks and activities without pain and/or without experiencing urinary leakage. Pt also has difficulty emptying bowels with ease.  This patient will benefit from PFPT in order to address the above mentioned deficits. DISCHARGE SUMMARY: Ms. Porfirio Luna has been seen in skilled PT from 2/24/2022 to 4/7/2022. Patient has attended 6 out of 7 scheduled visits, with 1 cancellation(s) and 0 no shows. Treatment has emphasized hip/PFM mobility, bladder health education, bowel health education, toilet positioning techniques, education on vaginal/vulvar irritants and good vaginal/vulvar hygiene, body and breathing mechanics and pressure management strategies, diaphragmatic breathing, and manual techniques to hip/PFM. Patient responded well to therapy, with improvements in pelvic/perineal pain, constipation, urinary incontinence, and nocturia. Pt has achieved goals as indicated below and all questions have been answered to their satisfaction. Pt was invited to call with any further questions or concerns as needed. PROBLEM LIST (Impacting functional limitations):  1. Decreased Strength  2. Increased Pain  3. Decreased Activity Tolerance  4. Decreased Flexibility/Joint Mobility  5. Decreased [place] with Home Exercise Program  6. Decreased strength of pelvic floor which limits bladder control INTERVENTIONS PLANNED: (Treatment may consist of any combination of the following)  1. Biofeedback as needed  2. Bladder retraining  3. Bladder education  4. Cold  5. Electrical Stimulation  6. Heat  7. Home Exercise Program (HEP)  8. Manual Therapy  9. Neuromuscular Re-education/Strengthening  10. Range of Motion (ROM)  11. Therapeutic Activites  12. Therapeutic Exercise/Strengthening  13. Transcutaneous Electrical Nerve Stimulation (TENS)     GOALS: (Goals have been discussed and agreed upon with patient.)  Short-Term Functional Goals: Time Frame: 3-4 weeks  1. Patient will demonstrate understanding of and ability to teach back appropriate water and fiber intake, bladder irritants, toileting frequency, and positioning for improved self-management of symptoms. GOAL MET  2.  Patient will demonstrate understanding of and ability to teach back two strategies to reduce constipation and improve toileting to minimize strain on and/or paradoxical movement of the pelvic floor muscles. GOAL MET  3. Patient will demonstrate ability to perform diaphragmatic breathing in multiple positions in order to improve pelvic floor muscle (PFM) lengthening and reduced discomfort and/or straining to aid in bowel evacuation. GOAL MET  4. Patient will engage in 20-30 minutes of physical activity per day in order to assist in digestion and ease of passing stools as well as stress management. PROGRESSING  5. Patient will demonstrate independence with basic pelvic floor muscle (PFM) home exercises program (HEP) to improve awareness, coordination, and timing of PFM. GOAL MET  6. Patient will demonstrate ability to isolate a pelvic floor contraction without breath holding and minimal to no accessory muscle use in order to implement the knack and/or urge suppression, reducing UI episodes by 50%. GOAL MET  7. Patient will demonstrate ability to perform diaphragmatic breathing in multiple positions to assist in pelvic floor tension reduction. GOAL MET  8. Patient will verbalize understanding and demonstrate postural adjustments which facilitate lengthening and reduce overall pelvic floor muscle activity. GOAL MET  9. Patient will be able to identify triggers for muscle guarding and be able to teach back 3 exercises or strategies that may be performed daily to reduce pain or discomfort. GOAL MET  Discharge Goals: Time Frame: 6-8 weeks  1. Patient will demonstrate ability to increase intra-abdominal pressure and eccentrically contract the pelvic floor muscles without breath holding, as assessed by digitally or with use of sEMG biofeedback, in order to improve bowel evacuation. GOAL MET  2. Patient will report minimal to no pain upon examination of the levator ani muscles and report minimal or no discomfort with bowel evacuation. GOAL MET  3.  Patient will demonstrate ability to voluntarily contract the pelvic floor muscles prior to a cough or sneeze for decreased leakage during increases in intra-abdominal pressure. GOAL MET  4. Patient will demonstrate independence with an advanced HEP for general conditioning, core stabilization, and mobility to facilitate carry over and independent management of symptoms. GOAL MET  5. Patient will be independent with implementation of a timed voiding schedule and use of urge suppression to improve urinary frequency to 6-8x/day and 0-1x/night. GOAL MET  6. Patient will improve outcome score by 12%. GOAL MET    OUTCOME MEASURE:   Tool Used: Pelvic Floor Impact Questionnaireshort form 7 (PFIQ-7)   Score:  Initial: 2/24/22 (100% impairment)  · Bladder or Urine: 100/100  · Bowel or Rectum: 100/100  · Vagina or Pelvis: 100/100 Most Recent: 4/7/22 (33.33% impairment)  · Bladder or Urine: 33.33/100  · Bowel or Rectum: 33.33/100  · Vagina or Pelvis: 33.33/100   Interpretation of Score: Each of the 7 sections is scored on a scale from 0-3; 0 representing \"Not at all\", 3 representing \"Quite a bit\". The mean value is taken from all the answered items, then multiplied by 100 to obtain the scale score, ranging from 0-100. This process is repeated for each column representing bowel, bladder, and pelvic pain. NOTE: pt states her symptoms are not stopping her from doing her ADLs, but she is still mindful of them    Total Duration: 55 minutes  PT Patient Time In/Time Out  Time In: 1055  Time Out: 1150     PAIN/SUBJECTIVE:   Initial: Pain Intensity 1: 0/10 Post Session:  0/10   HISTORY:   History of Injury/Illness (Reason for Referral):  Ms. Anthony Aldana is referred to pelvic floor physical therapy (PFPT) by Deacon Dewitt, * due to pelvic and perineal pain and constipation. Pt reports that symptoms are chronic, worsened in the last year. AT INITIAL EVALUATION (2/24/22):   Vaginal hysterectomy caused keloid scarring in the vagina - has bleed in the past with intercourse or with heavy lifting, no longer has an issue with bleeding  Bowels full causes more pressure on perineum and on bladder - feels better after a BM  Takes a stimulant laxative (more often the last four months, then got COVID and was less active, started needing to take it more often)  No longer goes to [employer] because she's anxious about having any leakage and also sitting on the hard seats causes pain to increase  Has Estradiol - is not using currently    Aggravating factors: sitting up straight (pressure on perineum), constipation  Relieving factors: ice on perineum, recline, tylenol  Pain:   Nature: pressure, achy  Pain scale:     - Current: 3/10     - Best: 0/10     - Worst: 9/10    Previous treatment includes laxatives for constipation management. AT DISCHARGE (4/7/22): With intermittent fasting - has lost 10 pounds  Being more careful with lifting objects to make sure she's not putting excess pressure on bladder/PFM  Holding urine better  Can sleep through the night    Urinary: Frequency 7x/day, 0-1x/night. Positive for none. Negative for stress urinary incontinence, urge urinary incontinence, urinary urgency, urinary frequency, incomplete emptying, urinary hesitancy/intermittency, dysuria, hematuria, nocturia and nocturnal enuresis. Pt does use pads for urinary protection; 1 pads per day (PPD) (just in case). Fluid intake: 32 oz water/day; bladder irritants include: coffee, unsweet tea. Pt does limit fluid intake due to bladder control (when she leaves the house)    Bowel: Frequency 3-4x/week. Positive for none. Negative for pain with bowel movement, pushing/straining with bowel movement, fecal incontinence, constipation and incomplete emptying. Pt does not use pads for bowel protection; 0 pads per day (PPD). Use of stool softeners or laxatives? NO    Sexual: Pt is not sexually active with male partners. Minimal pain with internal exam today.     Pelvic Organ Prolapse/Pelvic Pain: Location: perineum. Worse with sitting up straight (pressure on perineum). Relieved with stretches, reclining, Tylenol (less frequent). Max pain 3/10. Min pain 0/10. OB History: Number of pregnancies: 2. Number of vaginal deliveries: 1. Number of c-sections: 1. (67 hour labor with first child,  after 36 hours of labor with second)     EXAMINATION:   External Observations:   Voluntary contraction: [] absent     [x] present   Involuntary contraction: [x] absent     [] present (no bulge)   Involuntary relaxation: [x] absent     [] present   Perineal descent at rest: [x] absent     [] present   Perineal descent with bearing: [] absent     [x] present   Skin integrity: WNL    Pelvic Floor Muscle (PFM) Assessment:   Vaginal vault size: [] decreased     [] increased     [x] WNL   Muscle volume: [] decreased     [x] WNL     [] Defect   PFM tension: [] decreased     [x] increased (slight - improved)     [] WNL    Contraction ability:  Voluntary contraction: [] absent     [x] weak     [] moderate      [] strong  Voluntary relaxation: [] absent     [] partial     [x] complete   MMT: 2/5  PFM endurance: 2 seconds  Repetitions of endurance contractions: 6  Number of quick contractions in 10 seconds: DNT  Quality of contractions: good    Tissue laxity test:  Anterior wall: [] minimum     [] moderate     [] severe      [x] WNL  Posterior wall: [] minimum     [] moderate     [] severe      [x] WNL    Palpation: via vaginal canal  Superficial Pelvic Floor Musculature (PFM): Tender? Intermediate PFM Tender? Deep PFM Tender?    Superficial Transverse Perineal [] Right  [] Left  []DNT Deep Transverse Perineal [] Right  [] Left  []DNT Puborectalis [] Right  [] Left  []DNT   Ischiocavernosus [] Right  [] Left  []DNT Compressor Urethra [] Right  [] Left  []DNT  Pubococcygeus [] Right  [] Left  []DNT   Bulbocavernosus [] Right  [] Left  []DNT   Ileococcygeus [] Right  [] Left  []DNT       Obturator Internus [x] Right [] Left  []DNT       Coccygeus [] Right  [] Left  []DNT     Breath assessment:  Observation: good demonstration of diaphragmatic breathing  Coordination of pelvic floor muscles with breath: no pelvic floor muscle excursion  Co-contraction of PFM with transversus abdominis: present     Navin Rash, PT, DPT

## 2022-04-07 ENCOUNTER — HOSPITAL ENCOUNTER (OUTPATIENT)
Dept: PHYSICAL THERAPY | Age: 67
Discharge: HOME | End: 2022-04-07
Payer: MEDICARE

## 2022-04-07 PROCEDURE — 97140 MANUAL THERAPY 1/> REGIONS: CPT

## 2022-04-07 PROCEDURE — 97530 THERAPEUTIC ACTIVITIES: CPT
